# Patient Record
(demographics unavailable — no encounter records)

---

## 2025-04-07 NOTE — HISTORY OF PRESENT ILLNESS
[FreeTextEntry1] : NV hair loss and acne  [de-identified] : NILES YATES is a 35 year old female presenting for:  1. Hair loss for 1.5 years. Both of her sisters have hair loss. Scalp is not itchy nor painful. Has not tried treatment. No cardiac conditions. 2. Acne, flaring along her jawline for the past 3 months. Using moisturizer, SA patches, and a toner.  3. Rhytids, inquiring about Botox.

## 2025-04-07 NOTE — ASSESSMENT
[FreeTextEntry1] : #Androgenic alopecia, male pattern hair loss, chronic, not at goal -Discussed etiology (genetic, hormonal) and natural course of androgenetic alopecia as well as expectations for treatment: goal to retain hair and prevent further hair loss, some patients experience hair regrowth, need 6-12 mos of treatment to determine efficacy and continued use is required for sustained benefit - Discussed treatment options including topical / PO minoxidil - START oral minoxidil 0.625mg qhs (cut 2.5mg tablet into quarters) for 2 weeks then incresae to 1.25mg for 2 weeks then increase to 2.5 mg. SED including peripheral edema around legs and eyes, palpitations, dizziness and lightheadedness, and hypertrichosis. - consider adding spironolactone next visit   #Acne, primarily comedonal with possible hormonal component  - START Tretinoin 0.025% crm at bedtime. Apply pea size amt spread thinly over entire face, 30min after washing face, every other night for 2 wks, then advance to qhs as tolerated. - consider adding spironolactone  #Rhytides - may see Dr. Ardon for botox for the forehead and crows feet   RTC 3 months

## 2025-04-07 NOTE — HISTORY OF PRESENT ILLNESS
[FreeTextEntry1] : NV hair loss and acne  [de-identified] : NILES YATES is a 35 year old female presenting for:  1. Hair loss for 1.5 years. Both of her sisters have hair loss. Scalp is not itchy nor painful. Has not tried treatment. No cardiac conditions. 2. Acne, flaring along her jawline for the past 3 months. Using moisturizer, SA patches, and a toner.  3. Rhytids, inquiring about Botox.

## 2025-04-07 NOTE — PHYSICAL EXAM
[FreeTextEntry3] : few superficial pink papules on the jawline open and closed comedones on the forehead  hair thinning on the frontal hairline and scalp vertex

## 2025-04-07 NOTE — HISTORY OF PRESENT ILLNESS
[FreeTextEntry1] : NV hair loss and acne  [de-identified] : NIELS YATES is a 35 year old female presenting for:  1. Hair loss for 1.5 years. Both of her sisters have hair loss. Scalp is not itchy nor painful. Has not tried treatment. No cardiac conditions. 2. Acne, flaring along her jawline for the past 3 months. Using moisturizer, SA patches, and a toner.  3. Rhytids, inquiring about Botox.

## 2025-04-07 NOTE — HISTORY OF PRESENT ILLNESS
[FreeTextEntry1] : NV hair loss and acne  [de-identified] : NILES YATES is a 35 year old female presenting for:  1. Hair loss for 1.5 years. Both of her sisters have hair loss. Scalp is not itchy nor painful. Has not tried treatment. No cardiac conditions. 2. Acne, flaring along her jawline for the past 3 months. Using moisturizer, SA patches, and a toner.  3. Rhytids, inquiring about Botox.

## 2025-05-30 NOTE — PHYSICAL EXAM
[Chaperoned Physical Exam] : A chaperone was present in the examining room during all aspects of the physical examination. [Appropriately responsive] : appropriately responsive [Alert] : alert [No Acute Distress] : no acute distress [Soft] : soft [Non-tender] : non-tender [Non-distended] : non-distended [No HSM] : No HSM [No Lesions] : no lesions [No Mass] : no mass [Oriented x3] : oriented x3 [Examination Of The Breasts] : a normal appearance [No Masses] : no breast masses were palpable [Labia Majora] : normal [Labia Minora] : normal [IUD String] : an IUD string was noted [Normal] : normal [Uterine Adnexae] : normal [MA] : MA [FreeTextEntry2] : Ayleen

## 2025-05-30 NOTE — HISTORY OF PRESENT ILLNESS
[FreeTextEntry1] :  Patient is a 35 year old P0 presenting for an annual visit. Amenorrhea with Mirena in place since 2016. She is s/p oocyte retrieval in 11/2024, with [facility]  and currently has 12 oocytes. Pt reports family hx of breast cancer  (mother recently diagnosed in her 60's and grandmother in the past in her 70's). She denies vaginal itching, odor and discharge. Denies urinary urgency, frequency and dysuria. She is currently sexually active.  [PapSmeardate] : 2023

## 2025-07-01 NOTE — PROCEDURE
[IUD Placement] : intrauterine device (IUD) placement [Infection] : infection [Bleeding] : bleeding [Pain] : pain [Expulsion] : expulsion [Failure] : failure [Uterine Perforation] : uterine perforation [Neg Pregnancy Test] : negative pregnancy test [Betadine] : Betadine [Tenaculum] : Tenaculum [Mirena IUD] : Mirena IUD [US Guidance] : US Guidance [IUD Removal] : intrauterine device (IUD) removal [Time out performed] : Pre-procedure time out performed.  Patient's name, date of birth and procedure confirmed. [Consent Obtained] : Consent obtained [Risks] : risks [Benefits] : benefits [Alternatives] : alternatives [Patient] : patient [IUD Discarded] : IUD discarded [Sent to Pathology] : specimen was placed in buffered formalin and sent for pathology [Tolerated Well] : Patient tolerated the procedure well [No Complications] : no complications [Heavy Vaginal Bleeding] : for heavy vaginal bleeding [Pelvic Pain] : for pelvic pain [Speculum Placed] : speculum placed [IUD Removed - Forceps] : IUD removed - forceps [de-identified] : [id number] [de-identified] : 08/2027 [de-identified] : 07/2032

## 2025-07-01 NOTE — ASSESSMENT
[FreeTextEntry1] : 36 y/o F presenting for IUD removal and insertion -IUD removed and re-inserted without complication (please see procedure note for full details) -Patient does not need back up contraception  -Patient given a User Card with instructions to follow up as needed and to have the device removed in 7 years -f/u 4 weeks for IUD sono

## 2025-07-01 NOTE — PROCEDURE
[IUD Placement] : intrauterine device (IUD) placement [Infection] : infection [Bleeding] : bleeding [Pain] : pain [Expulsion] : expulsion [Failure] : failure [Uterine Perforation] : uterine perforation [Neg Pregnancy Test] : negative pregnancy test [Betadine] : Betadine [Tenaculum] : Tenaculum [Mirena IUD] : Mirena IUD [US Guidance] : US Guidance [IUD Removal] : intrauterine device (IUD) removal [Time out performed] : Pre-procedure time out performed.  Patient's name, date of birth and procedure confirmed. [Consent Obtained] : Consent obtained [Risks] : risks [Benefits] : benefits [Alternatives] : alternatives [Patient] : patient [IUD Discarded] : IUD discarded [Sent to Pathology] : specimen was placed in buffered formalin and sent for pathology [Tolerated Well] : Patient tolerated the procedure well [No Complications] : no complications [Heavy Vaginal Bleeding] : for heavy vaginal bleeding [Pelvic Pain] : for pelvic pain [Speculum Placed] : speculum placed [IUD Removed - Forceps] : IUD removed - forceps [de-identified] : [id number] [de-identified] : 08/2027 [de-identified] : 07/2032

## 2025-07-28 NOTE — PLAN
[FreeTextEntry1] : 36 y/o F presenting for IUD surveillance sonogram -IUD in place - please see sonogram report for full details -f/u PRN

## 2025-07-28 NOTE — HISTORY OF PRESENT ILLNESS
[FreeTextEntry1] : Patient is a 34 y/o F presenting for IUD surveillance sonogram. Mirena IUD placed without complication. Patient is feeling well today and is without complaints.